# Patient Record
Sex: FEMALE | Race: WHITE | Employment: UNEMPLOYED | URBAN - METROPOLITAN AREA
[De-identification: names, ages, dates, MRNs, and addresses within clinical notes are randomized per-mention and may not be internally consistent; named-entity substitution may affect disease eponyms.]

---

## 2023-10-08 ENCOUNTER — HOSPITAL ENCOUNTER (OUTPATIENT)
Age: 78
Discharge: HOME | End: 2023-10-08
Payer: MEDICARE

## 2023-10-08 VITALS
SYSTOLIC BLOOD PRESSURE: 123 MMHG | OXYGEN SATURATION: 100 % | DIASTOLIC BLOOD PRESSURE: 70 MMHG | RESPIRATION RATE: 16 BRPM | HEART RATE: 70 BPM | TEMPERATURE: 97 F

## 2023-10-08 DIAGNOSIS — T30.0 BURN: ICD-10-CM

## 2023-10-08 DIAGNOSIS — L03.90 CELLULITIS, UNSPECIFIED CELLULITIS SITE: Primary | ICD-10-CM

## 2023-10-08 RX ORDER — CEPHALEXIN 500 MG/1
500 CAPSULE ORAL 3 TIMES DAILY
Qty: 30 CAPSULE | Refills: 0 | Status: SHIPPED | OUTPATIENT
Start: 2023-10-08 | End: 2023-10-18

## 2023-10-08 NOTE — ED INITIAL ASSESSMENT (HPI)
Patient is here with  a red rash that she states she had for a week but daughter states for months. She states she went to a dermatologist on Tuesday and was given medications. She states her skin is worse.

## 2024-01-01 ENCOUNTER — APPOINTMENT (OUTPATIENT)
Dept: CT IMAGING | Age: 79
DRG: 951 | End: 2024-01-01
Attending: EMERGENCY MEDICINE

## 2024-01-01 PROCEDURE — 72125 CT NECK SPINE W/O DYE: CPT

## 2024-01-01 PROCEDURE — 70450 CT HEAD/BRAIN W/O DYE: CPT

## 2024-01-01 PROCEDURE — 70486 CT MAXILLOFACIAL W/O DYE: CPT

## 2024-04-06 ENCOUNTER — HOSPITAL ENCOUNTER (OUTPATIENT)
Age: 79
Discharge: HOME | End: 2024-04-06
Payer: MEDICARE

## 2024-04-06 ENCOUNTER — APPOINTMENT (OUTPATIENT)
Dept: GENERAL RADIOLOGY | Age: 79
End: 2024-04-06
Attending: NURSE PRACTITIONER
Payer: MEDICARE

## 2024-04-06 VITALS
TEMPERATURE: 97 F | DIASTOLIC BLOOD PRESSURE: 81 MMHG | RESPIRATION RATE: 14 BRPM | HEART RATE: 75 BPM | OXYGEN SATURATION: 99 % | SYSTOLIC BLOOD PRESSURE: 121 MMHG

## 2024-04-06 DIAGNOSIS — M79.672 ACUTE FOOT PAIN, LEFT: ICD-10-CM

## 2024-04-06 DIAGNOSIS — L03.032 CELLULITIS OF TOE OF LEFT FOOT: Primary | ICD-10-CM

## 2024-04-06 PROCEDURE — 73630 X-RAY EXAM OF FOOT: CPT | Performed by: NURSE PRACTITIONER

## 2024-04-06 PROCEDURE — 99213 OFFICE O/P EST LOW 20 MIN: CPT | Performed by: NURSE PRACTITIONER

## 2024-04-06 RX ORDER — CEPHALEXIN 500 MG/1
500 CAPSULE ORAL 4 TIMES DAILY
Qty: 40 CAPSULE | Refills: 0 | Status: SHIPPED | OUTPATIENT
Start: 2024-04-06 | End: 2024-04-16

## 2024-04-06 NOTE — ED PROVIDER NOTES
Patient Seen in: [facility]      History     Chief Complaint   Patient presents with    Nail, Ingrown     Stated Complaint: Toe Problem Pain    Subjective:   HPI    79-year-old female presents with erythema and swelling of left second toe distal extending up to dorsum of foot for unknown time.  States swelling has decreased slightly but now is streaking up foot.  Noticed wound on side of second toe, with blood.  Does not recall injury.  Did not fall.    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  Constitutional and vital signs reviewed.      All other systems reviewed and negative except as noted above.    Physical Exam     ED Triage Vitals [04/06/24 1133]   /81   Pulse 75   Resp 14   Temp 97.1 °F (36.2 °C)   Temp src Temporal   SpO2 99 %   O2 Device None (Room air)       Current:/81   Pulse 75   Temp 97.1 °F (36.2 °C) (Temporal)   Resp 14   SpO2 99%         Physical Exam  Vitals and nursing note reviewed.   Constitutional:       General: She is not in acute distress.     Appearance: She is not toxic-appearing.   HENT:      Head: Normocephalic.      Nose: Nose normal. No congestion or rhinorrhea.      Mouth/Throat:      Mouth: Mucous membranes are moist.   Cardiovascular:      Pulses:           Dorsalis pedis pulses are 1+ on the left side.        Posterior tibial pulses are 1+  on the left side.   Pulmonary:      Effort: Pulmonary effort is normal. No respiratory distress.   Abdominal:      General: Abdomen is flat.   Musculoskeletal:         General: Normal range of motion.      Cervical back: Normal range of motion.        Feet:    Feet:      Right foot:      Toenail Condition: Right toenails are abnormally thick.      Left foot:      Skin integrity: Erythema present.      Toenail Condition: Left toenails are abnormally thick.      Comments: In between 2nd and 3rd distal toes is an skin erosion w/skin breakdown  Skin:     General: Skin is warm and dry.      Capillary Refill: Capillary refill takes less than 2 seconds.   Neurological:      General: No focal deficit present.      Mental Status: She is alert.               ED Course   Labs Reviewed - No data to display       ED Course as of 04/06/24 1251  ------------------------------------------------------------  Time: 04/06 1216  Value: XR FOOT, COMPLETE (MIN 3 VIEWS), LEFT (CPT=73630)  Comment:  Soft tissue inflammation the 2nd toe which could represent changes of cellulitis, vascular insufficiency, unrecognized trauma or other inflammatory process.  No acute osseous abnormality of the left foot.              MDM                                         Medical Decision Making  80y/o female p/w contusion v fracture v cellulitis of 2nd toe of L foot. Unknown mechanism. Pending xray. Major consideration is cellulitis given erythema of dorsal toe to foot. No active drainage from ulceration in between 2nd and 3rd toe.     Xray of foot left>I have directly viewed this exam, Negative per my read for acute fracture/dislocation. Pending rad read.     Xray> Soft tissue inflammation the 2nd toe which could represent changes of cellulitis, vascular insufficiency, unrecognized trauma or other inflammatory process.  No acute osseous abnormality of the left foot.    Will treat for skin infection, and sent rx for antibiotics, discussed podiatry follow  up given appropriate follow up here.     Physical exam remained stable over serial reexaminations as previously documented.      I have discussed with the patient the results of tests, differential diagnosis, and warning signs and symptoms that should prompt immediate return.  The patient understands these instructions and agrees to the follow-up plan provided.  There are no barriers to learning.   Appropriate f/u given.  Patient agrees to return for any concerns/problems/complications.            Disposition and Plan     Clinical Impression:  1. Cellulitis of toe of left foot    2. Acute foot pain, left         Disposition:  Discharge  4/6/2024 12:33 pm    Follow-up:  Tony Dee MD  [address] [address]  [phone]          Yeison Echeverria DPM  [address]  [place] [address] [phone]      As needed          Medications Prescribed:  Discharge Medication List as of 4/6/2024 12:33 PM        START taking these medications    Details   cephalexin 500 MG Oral Cap Take 1 capsule (500 mg total) by mouth 4 (four) times daily for 10 days., Normal, Disp-40 capsule, R-0

## 2024-04-06 NOTE — DISCHARGE INSTRUCTIONS
Watch for the redness to Left 2nd toe, markie if goes beyond demarcated line. I sent antibiotics to your pharmacy. Please follow up with your primary care doctor for further care and podiatry if needed.     -Please call your doctor or return if you notice signs of infection including:  a)Redness  b)Swelling  c)Foul odor  d)Discharge  e)Fever  f)Increased Pain  g)If the wound becomes warm to the touch

## 2024-06-06 ENCOUNTER — HOSPITAL ENCOUNTER (EMERGENCY)
Age: 79
Discharge: HOME | End: 2024-06-06
Attending: STUDENT IN AN ORGANIZED HEALTH CARE EDUCATION/TRAINING PROGRAM

## 2024-06-06 VITALS
SYSTOLIC BLOOD PRESSURE: 154 MMHG | BODY MASS INDEX: 25.71 KG/M2 | DIASTOLIC BLOOD PRESSURE: 102 MMHG | HEIGHT: 65 IN | WEIGHT: 154.32 LBS | RESPIRATION RATE: 16 BRPM | OXYGEN SATURATION: 97 % | TEMPERATURE: 97.8 F | HEART RATE: 70 BPM

## 2024-06-06 DIAGNOSIS — R42 POSTURAL DIZZINESS: Primary | ICD-10-CM

## 2024-06-06 LAB
ALBUMIN SERPL-MCNC: 3.8 G/DL (ref 3.6–5.1)
ALBUMIN/GLOB SERPL: 1.3 {RATIO} (ref 1–2.4)
ALP SERPL-CCNC: 106 UNITS/L (ref 45–117)
ALT SERPL-CCNC: 35 UNITS/L
ANION GAP SERPL CALC-SCNC: 8 MMOL/L (ref 7–19)
AST SERPL-CCNC: 29 UNITS/L
ATRIAL RATE (BPM): 78
BASOPHILS # BLD: 0 K/MCL (ref 0–0.3)
BASOPHILS NFR BLD: 1 %
BILIRUB SERPL-MCNC: 1.1 MG/DL (ref 0.2–1)
BUN SERPL-MCNC: 19 MG/DL (ref 6–20)
BUN/CREAT SERPL: 20 (ref 7–25)
CALCIUM SERPL-MCNC: 9.9 MG/DL (ref 8.4–10.2)
CHLORIDE SERPL-SCNC: 109 MMOL/L (ref 97–110)
CO2 SERPL-SCNC: 25 MMOL/L (ref 21–32)
CREAT SERPL-MCNC: 0.93 MG/DL (ref 0.51–0.95)
DEPRECATED RDW RBC: 46.6 FL (ref 39–50)
EGFRCR SERPLBLD CKD-EPI 2021: 62 ML/MIN/{1.73_M2}
EOSINOPHIL # BLD: 0.2 K/MCL (ref 0–0.5)
EOSINOPHIL NFR BLD: 3 %
ERYTHROCYTE [DISTWIDTH] IN BLOOD: 14.5 % (ref 11–15)
FASTING DURATION TIME PATIENT: ABNORMAL H
GLOBULIN SER-MCNC: 3 G/DL (ref 2–4)
GLUCOSE SERPL-MCNC: 131 MG/DL (ref 70–99)
HCT VFR BLD CALC: 39 % (ref 36–46.5)
HGB BLD-MCNC: 13 G/DL (ref 12–15.5)
IMM GRANULOCYTES # BLD AUTO: 0 K/MCL (ref 0–0.2)
IMM GRANULOCYTES # BLD: 0 %
LYMPHOCYTES # BLD: 1.1 K/MCL (ref 1–4)
LYMPHOCYTES NFR BLD: 20 %
MCH RBC QN AUTO: 29.2 PG (ref 26–34)
MCHC RBC AUTO-ENTMCNC: 33.3 G/DL (ref 32–36.5)
MCV RBC AUTO: 87.6 FL (ref 78–100)
MONOCYTES # BLD: 0.5 K/MCL (ref 0.3–0.9)
MONOCYTES NFR BLD: 10 %
NEUTROPHILS # BLD: 3.6 K/MCL (ref 1.8–7.7)
NEUTROPHILS NFR BLD: 66 %
NRBC BLD MANUAL-RTO: 0 /100 WBC
PLATELET # BLD AUTO: 151 K/MCL (ref 140–450)
POTASSIUM SERPL-SCNC: 3.9 MMOL/L (ref 3.4–5.1)
PROT SERPL-MCNC: 6.8 G/DL (ref 6.4–8.2)
QRS-INTERVAL (MSEC): 150
QT-INTERVAL (MSEC): 476
QTC: 514
R AXIS (DEGREES): -120
RAINBOW EXTRA TUBES HOLD SPECIMEN: NORMAL
RAINBOW EXTRA TUBES HOLD SPECIMEN: NORMAL
RBC # BLD: 4.45 MIL/MCL (ref 4–5.2)
REPORT TEXT: NORMAL
SODIUM SERPL-SCNC: 138 MMOL/L (ref 135–145)
T AXIS (DEGREES): 61
TROPONIN I SERPL DL<=0.01 NG/ML-MCNC: 7 NG/L
VENTRICULAR RATE EKG/MIN (BPM): 70
WBC # BLD: 5.4 K/MCL (ref 4.2–11)

## 2024-06-06 PROCEDURE — 85025 COMPLETE CBC W/AUTO DIFF WBC: CPT | Performed by: STUDENT IN AN ORGANIZED HEALTH CARE EDUCATION/TRAINING PROGRAM

## 2024-06-06 PROCEDURE — 80053 COMPREHEN METABOLIC PANEL: CPT | Performed by: STUDENT IN AN ORGANIZED HEALTH CARE EDUCATION/TRAINING PROGRAM

## 2024-06-06 PROCEDURE — 93010 ELECTROCARDIOGRAM REPORT: CPT | Performed by: INTERNAL MEDICINE

## 2024-06-06 PROCEDURE — 96360 HYDRATION IV INFUSION INIT: CPT

## 2024-06-06 PROCEDURE — 84484 ASSAY OF TROPONIN QUANT: CPT | Performed by: STUDENT IN AN ORGANIZED HEALTH CARE EDUCATION/TRAINING PROGRAM

## 2024-06-06 PROCEDURE — 93005 ELECTROCARDIOGRAM TRACING: CPT | Performed by: STUDENT IN AN ORGANIZED HEALTH CARE EDUCATION/TRAINING PROGRAM

## 2024-06-06 PROCEDURE — 99284 EMERGENCY DEPT VISIT MOD MDM: CPT

## 2024-06-06 PROCEDURE — 10002807 HB RX 258: Performed by: STUDENT IN AN ORGANIZED HEALTH CARE EDUCATION/TRAINING PROGRAM

## 2024-06-06 RX ADMIN — SODIUM CHLORIDE 1000 ML: 9 INJECTION, SOLUTION INTRAVENOUS at 08:53

## 2024-06-06 ASSESSMENT — PAIN SCALES - GENERAL
PAINLEVEL_OUTOF10: 0
PAINLEVEL_OUTOF10: 0

## 2024-06-14 ENCOUNTER — HOSPITAL ENCOUNTER (OUTPATIENT)
Age: 79
Discharge: ANOTHER HOSPITAL | End: 2024-06-14
Payer: MEDICARE

## 2024-06-14 VITALS
HEART RATE: 70 BPM | OXYGEN SATURATION: 100 % | SYSTOLIC BLOOD PRESSURE: 134 MMHG | TEMPERATURE: 97 F | HEIGHT: 65 IN | RESPIRATION RATE: 22 BRPM | DIASTOLIC BLOOD PRESSURE: 68 MMHG | WEIGHT: 140 LBS | BODY MASS INDEX: 23.32 KG/M2

## 2024-06-14 DIAGNOSIS — R42 DIZZINESS: Primary | ICD-10-CM

## 2024-06-14 LAB
ATRIAL RATE: 68 BPM
GLUCOSE BLDC GLUCOMTR-MCNC: 113 MG/DL (ref 70–99)
Q-T INTERVAL: 522 MS
QRS DURATION: 178 MS
QTC CALCULATION (BEZET): 563 MS
R AXIS: 170 DEGREES
T AXIS: 28 DEGREES
VENTRICULAR RATE: 70 BPM

## 2024-06-14 PROCEDURE — 93000 ELECTROCARDIOGRAM COMPLETE: CPT | Performed by: NURSE PRACTITIONER

## 2024-06-14 PROCEDURE — 82962 GLUCOSE BLOOD TEST: CPT | Performed by: NURSE PRACTITIONER

## 2024-06-14 PROCEDURE — 99215 OFFICE O/P EST HI 40 MIN: CPT | Performed by: NURSE PRACTITIONER

## 2024-06-14 NOTE — ED INITIAL ASSESSMENT (HPI)
Patient presents to IC with c/o dizziness over the last couple days.  Exacerbated with position changes.  Denies CP/SOB.

## 2024-06-14 NOTE — ED PROVIDER NOTES
Patient Seen in: [facility]      History     Chief Complaint   Patient presents with    Dizziness     Stated Complaint: Dizzness    Subjective:   HPI    80yo female p/w dizziness especially in the AM when moving from bed to standing. Started 2 days ago and usually goes away \"after walking, but it hasn't gone away today.\" Went to bed last night around 2200, woke up at doesn't recall time. Denies f/c/n/v/d. States she cannot walk long distances w/o sitting down. No dysuria/hematuria noted. No recent illness. No headaches noted.   No chest pain/sob. No stroke like symptoms.     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  Constitutional and vital signs reviewed.      All other systems reviewed and negative except as noted above.    Physical Exam     ED Triage Vitals [06/14/24 1110]   /73   Pulse 70   Resp 22   Temp 97.1 °F (36.2 °C)   Temp src Oral   SpO2 100 %   O2 Device None (Room air)       Current Vitals:   Vital Signs  BP: 134/68  Pulse: 70  Resp: 22  Temp: 97.1 °F (36.2 °C)  Temp src: Oral    Oxygen Therapy  SpO2: 100 %  O2 Device: None (Room air)            Physical Exam  Vitals and nursing note reviewed.   Constitutional:       General: She is not in acute distress.     Appearance: She is not toxic-appearing.    HENT:      Head: Normocephalic.      Nose: Nose normal. No congestion or rhinorrhea.      Mouth/Throat:      Mouth: Mucous membranes are moist.   Cardiovascular:      Rate and Rhythm: Normal rate and regular rhythm.   Pulmonary:      Effort: Pulmonary effort is normal. No respiratory distress.   Abdominal:      General: Abdomen is flat.   Musculoskeletal:         General: Normal range of motion.      Cervical back: Normal range of motion.   Skin:     General: Skin is warm and dry.      Capillary Refill: Capillary refill takes less than 2 seconds.   Neurological:      General: No focal deficit present.      Mental Status: She is alert.               ED Course   Labs Reviewed - No data to display  EKG    Rate, intervals and axes as noted on EKG Report.  Rate: 70  Rhythm: Ventricular-paced rhythm  Reading: Ventricular-paced rhythm, biventricular pacemaker detected              ED Course as of 06/14/24 1202  ------------------------------------------------------------  Time: 06/14 1128  Value: EKG 12 Lead  Comment:      Narrative  Performed by: [facility]    Ventricular-paced rhythm  Biventricular pacemaker detected  Abnormal ECG  No previous ECGs found in [facility]                    [facility]                                         Medical Decision Making  80yo female p/w dizziness x 2-3 days. Cf TIA v stroke. No recent trauma. No hematuria/dysuria. No other infectious findings.     -not likely migraine v tension headache or temporal artertis  -no recent illness making viral illness, or sinusitis unlikely, NOT worst HA of life NOT likely to be meningitis, NO meningeal symptoms  -maybe related to stroke as having global dizziness  Discuss w/Dr Carreon      No chest pain/iwob/sob. EKG w/paced rhythm.     Given the limitations of the immediate care and the likely need for advanced lab testing and/or imaging not available here the patient will be sent to the emergency department for further evaluation and management.  Offered and  declined EMS transfer. Here w/granddaughter whom will escort patient to ER at this time. Escorted via w/c to car.     Differential diagnosis reflecting the complexity of care include: MI, dehydration, UTI, CVA, TIA, dizziness, vertigo    Comorbidities that add complexity to management include:Pacemaker, heart history, medication history, age    External chart review was done and was noted:Yes    History obtained by an independent source was from: Patient    Discussions of management was done with:Patient    My independent interpretation of studies of:na    Diagnostic tests and medications considered but not ordered were:labs, CT scan    Social determinants of health that affect care:NA    Shared decision making was done by Self, Patient                Disposition and Plan     Clinical Impression:  1. Dizziness         Disposition:  Ic to ed  6/14/2024 11:49 am    Follow-up:  Tony Dee MD  [address] [address]  [phone]                Medications Prescribed:  Discharge Medication List as of 6/14/2024 11:51 AM

## 2024-06-17 ENCOUNTER — HOSPITAL ENCOUNTER (EMERGENCY)
Age: 79
Discharge: HOME | End: 2024-06-17
Attending: EMERGENCY MEDICINE

## 2024-06-17 ENCOUNTER — APPOINTMENT (OUTPATIENT)
Dept: CT IMAGING | Age: 79
End: 2024-06-17
Attending: EMERGENCY MEDICINE

## 2024-06-17 VITALS
WEIGHT: 143.08 LBS | HEIGHT: 65 IN | TEMPERATURE: 97.6 F | DIASTOLIC BLOOD PRESSURE: 73 MMHG | BODY MASS INDEX: 23.84 KG/M2 | HEART RATE: 75 BPM | SYSTOLIC BLOOD PRESSURE: 120 MMHG | OXYGEN SATURATION: 97 % | RESPIRATION RATE: 16 BRPM

## 2024-06-17 DIAGNOSIS — S09.90XA INJURY OF HEAD, INITIAL ENCOUNTER: Primary | ICD-10-CM

## 2024-06-17 PROCEDURE — 99285 EMERGENCY DEPT VISIT HI MDM: CPT

## 2024-06-17 PROCEDURE — 70450 CT HEAD/BRAIN W/O DYE: CPT

## 2024-06-17 PROCEDURE — 72125 CT NECK SPINE W/O DYE: CPT

## 2024-06-17 ASSESSMENT — PAIN SCALES - GENERAL: PAINLEVEL_OUTOF10: 1

## 2024-08-27 ENCOUNTER — APPOINTMENT (OUTPATIENT)
Dept: GENERAL RADIOLOGY | Age: 79
End: 2024-08-27
Attending: EMERGENCY MEDICINE

## 2024-08-27 ENCOUNTER — HOSPITAL ENCOUNTER (EMERGENCY)
Age: 79
Discharge: HOME | End: 2024-08-27
Attending: EMERGENCY MEDICINE

## 2024-08-27 VITALS
RESPIRATION RATE: 16 BRPM | TEMPERATURE: 97.9 F | SYSTOLIC BLOOD PRESSURE: 102 MMHG | HEART RATE: 74 BPM | DIASTOLIC BLOOD PRESSURE: 64 MMHG | OXYGEN SATURATION: 98 %

## 2024-08-27 DIAGNOSIS — R53.1 GENERALIZED WEAKNESS: Primary | ICD-10-CM

## 2024-08-27 LAB
ALBUMIN SERPL-MCNC: 3 G/DL (ref 3.6–5.1)
ALBUMIN/GLOB SERPL: 0.9 {RATIO} (ref 1–2.4)
ALP SERPL-CCNC: 107 UNITS/L (ref 45–117)
ALT SERPL-CCNC: 27 UNITS/L
ANION GAP SERPL CALC-SCNC: 10 MMOL/L (ref 7–19)
APPEARANCE UR: ABNORMAL
AST SERPL-CCNC: 28 UNITS/L
BACTERIA #/AREA URNS HPF: ABNORMAL /HPF
BASOPHILS # BLD: 0.1 K/MCL (ref 0–0.3)
BASOPHILS NFR BLD: 2 %
BILIRUB SERPL-MCNC: 1.5 MG/DL (ref 0.2–1)
BILIRUB UR QL STRIP: NEGATIVE
BUN SERPL-MCNC: 12 MG/DL (ref 6–20)
BUN/CREAT SERPL: 12 (ref 7–25)
CALCIUM SERPL-MCNC: 9.8 MG/DL (ref 8.4–10.2)
CHLORIDE SERPL-SCNC: 101 MMOL/L (ref 97–110)
CO2 SERPL-SCNC: 26 MMOL/L (ref 21–32)
COLOR UR: YELLOW
CREAT SERPL-MCNC: 1.02 MG/DL (ref 0.51–0.95)
DEPRECATED RDW RBC: 53.4 FL (ref 39–50)
EGFRCR SERPLBLD CKD-EPI 2021: 55 ML/MIN/{1.73_M2}
EOSINOPHIL # BLD: 0.9 K/MCL (ref 0–0.5)
EOSINOPHIL NFR BLD: 16 %
ERYTHROCYTE [DISTWIDTH] IN BLOOD: 16.5 % (ref 11–15)
FASTING DURATION TIME PATIENT: ABNORMAL H
FLUAV RNA RESP QL NAA+PROBE: NOT DETECTED
FLUBV RNA RESP QL NAA+PROBE: NOT DETECTED
GLOBULIN SER-MCNC: 3.2 G/DL (ref 2–4)
GLUCOSE SERPL-MCNC: 93 MG/DL (ref 70–99)
GLUCOSE UR STRIP-MCNC: NEGATIVE MG/DL
HCT VFR BLD CALC: 33.8 % (ref 36–46.5)
HGB BLD-MCNC: 11.2 G/DL (ref 12–15.5)
HGB UR QL STRIP: NEGATIVE
HYALINE CASTS #/AREA URNS LPF: ABNORMAL /LPF
KETONES UR STRIP-MCNC: ABNORMAL MG/DL
LEUKOCYTE ESTERASE UR QL STRIP: ABNORMAL
LYMPHOCYTES # BLD: 0.3 K/MCL (ref 1–4)
LYMPHOCYTES NFR BLD: 5 %
MAGNESIUM SERPL-MCNC: 1.8 MG/DL (ref 1.7–2.4)
MCH RBC QN AUTO: 29.5 PG (ref 26–34)
MCHC RBC AUTO-ENTMCNC: 33.1 G/DL (ref 32–36.5)
MCV RBC AUTO: 88.9 FL (ref 78–100)
MONOCYTES # BLD: 0.3 K/MCL (ref 0.3–0.9)
MONOCYTES NFR BLD: 5 %
NEUTROPHILS # BLD: 4.2 K/MCL (ref 1.8–7.7)
NEUTS SEG NFR BLD: 72 %
NITRITE UR QL STRIP: NEGATIVE
NRBC BLD MANUAL-RTO: 0 /100 WBC
PH UR STRIP: 6 [PH] (ref 5–7)
PLAT MORPH BLD: NORMAL
PLATELET # BLD AUTO: 84 K/MCL (ref 140–450)
POTASSIUM SERPL-SCNC: 3.3 MMOL/L (ref 3.4–5.1)
PROT SERPL-MCNC: 6.2 G/DL (ref 6.4–8.2)
PROT UR STRIP-MCNC: ABNORMAL MG/DL
QRS-INTERVAL (MSEC): 98
QRS-INTERVAL (MSEC): 98
QT-INTERVAL (MSEC): 380
QT-INTERVAL (MSEC): 394
QTC: 422
QTC: 477
R AXIS (DEGREES): 21
R AXIS (DEGREES): 46
RAINBOW EXTRA TUBES HOLD SPECIMEN: NORMAL
RAINBOW EXTRA TUBES HOLD SPECIMEN: NORMAL
RBC # BLD: 3.8 MIL/MCL (ref 4–5.2)
RBC #/AREA URNS HPF: ABNORMAL /HPF
RBC MORPH BLD: NORMAL
REPORT TEXT: NORMAL
REPORT TEXT: NORMAL
RSV AG NPH QL IA.RAPID: NOT DETECTED
SARS-COV-2 RNA RESP QL NAA+PROBE: NOT DETECTED
SERVICE CMNT-IMP: NORMAL
SERVICE CMNT-IMP: NORMAL
SODIUM SERPL-SCNC: 134 MMOL/L (ref 135–145)
SP GR UR STRIP: 1.02 (ref 1–1.03)
SQUAMOUS #/AREA URNS HPF: ABNORMAL /HPF
T AXIS (DEGREES): -28
T AXIS (DEGREES): -39
TROPONIN I SERPL DL<=0.01 NG/ML-MCNC: 6 NG/L
TROPONIN I SERPL DL<=0.01 NG/ML-MCNC: 8 NG/L
UROBILINOGEN UR STRIP-MCNC: 2 MG/DL
VENTRICULAR RATE EKG/MIN (BPM): 74
VENTRICULAR RATE EKG/MIN (BPM): 88
WBC # BLD: 5.9 K/MCL (ref 4.2–11)
WBC #/AREA URNS HPF: ABNORMAL /HPF
WBC TOXIC VACUOLES BLD QL SMEAR: PRESENT

## 2024-08-27 PROCEDURE — 85027 COMPLETE CBC AUTOMATED: CPT | Performed by: EMERGENCY MEDICINE

## 2024-08-27 PROCEDURE — 84484 ASSAY OF TROPONIN QUANT: CPT | Performed by: EMERGENCY MEDICINE

## 2024-08-27 PROCEDURE — 71046 X-RAY EXAM CHEST 2 VIEWS: CPT

## 2024-08-27 PROCEDURE — 93010 ELECTROCARDIOGRAM REPORT: CPT | Performed by: INTERNAL MEDICINE

## 2024-08-27 PROCEDURE — 80053 COMPREHEN METABOLIC PANEL: CPT | Performed by: EMERGENCY MEDICINE

## 2024-08-27 PROCEDURE — 0241U COVID/FLU/RSV PANEL: CPT | Performed by: EMERGENCY MEDICINE

## 2024-08-27 PROCEDURE — 10002803 HB RX 637: Performed by: EMERGENCY MEDICINE

## 2024-08-27 PROCEDURE — 83735 ASSAY OF MAGNESIUM: CPT | Performed by: EMERGENCY MEDICINE

## 2024-08-27 PROCEDURE — 99285 EMERGENCY DEPT VISIT HI MDM: CPT

## 2024-08-27 PROCEDURE — 81001 URINALYSIS AUTO W/SCOPE: CPT | Performed by: EMERGENCY MEDICINE

## 2024-08-27 PROCEDURE — 87186 SC STD MICRODIL/AGAR DIL: CPT | Performed by: EMERGENCY MEDICINE

## 2024-08-27 PROCEDURE — 36415 COLL VENOUS BLD VENIPUNCTURE: CPT

## 2024-08-27 PROCEDURE — 93005 ELECTROCARDIOGRAM TRACING: CPT | Performed by: EMERGENCY MEDICINE

## 2024-08-27 RX ADMIN — AMOXICILLIN AND CLAVULANATE POTASSIUM 1 TABLET: 875; 125 TABLET, FILM COATED ORAL at 12:37

## 2024-08-27 SDOH — SOCIAL STABILITY: SOCIAL INSECURITY: HOW OFTEN DOES ANYONE, INCLUDING FAMILY AND FRIENDS, THREATEN YOU WITH HARM?: NEVER

## 2024-08-27 SDOH — SOCIAL STABILITY: SOCIAL INSECURITY: HOW OFTEN DOES ANYONE, INCLUDING FAMILY AND FRIENDS, PHYSICALLY HURT YOU?: NEVER

## 2024-08-27 SDOH — SOCIAL STABILITY: SOCIAL INSECURITY: HOW OFTEN DOES ANYONE, INCLUDING FAMILY AND FRIENDS, INSULT OR TALK DOWN TO YOU?: NEVER

## 2024-08-27 SDOH — SOCIAL STABILITY: SOCIAL INSECURITY: HOW OFTEN DOES ANYONE, INCLUDING FAMILY AND FRIENDS, SCREAM OR CURSE AT YOU?: NEVER

## 2024-08-27 ASSESSMENT — PAIN SCALES - GENERAL: PAINLEVEL_OUTOF10: 0

## 2024-08-29 LAB — BACTERIA UR CULT: ABNORMAL

## 2024-09-05 ENCOUNTER — HOSPITAL ENCOUNTER (INPATIENT)
Age: 79
Discharge: STILL ADMITTED OR ADMINISTRATIVE | DRG: 951 | End: 2024-09-05
Attending: EMERGENCY MEDICINE | Admitting: INTERNAL MEDICINE

## 2024-09-05 DIAGNOSIS — S09.90XA TRAUMATIC INJURY OF HEAD, INITIAL ENCOUNTER: Primary | ICD-10-CM

## 2024-09-05 DIAGNOSIS — D69.6 THROMBOCYTOPENIA (CMD): ICD-10-CM

## 2024-09-05 DIAGNOSIS — S06.5XAA SUBDURAL HEMATOMA  (CMD): ICD-10-CM

## 2024-09-05 PROBLEM — I61.9 BRAIN BLEED  (CMD): Status: ACTIVE | Noted: 2024-01-01

## 2024-09-05 LAB
ABO + RH BLD: NORMAL
ABO + RH BLD: NORMAL
ABO REVERSE IS: NORMAL
ALBUMIN SERPL-MCNC: 3.3 G/DL (ref 3.6–5.1)
ALBUMIN/GLOB SERPL: 1 {RATIO} (ref 1–2.4)
ALP SERPL-CCNC: 94 UNITS/L (ref 45–117)
ALT SERPL-CCNC: 27 UNITS/L
ANION GAP SERPL CALC-SCNC: 12 MMOL/L (ref 7–19)
APTT PPP: 28 SEC (ref 22–32)
AST SERPL-CCNC: 32 UNITS/L
BILIRUB SERPL-MCNC: 3.1 MG/DL (ref 0.2–1)
BLD GP AB SCN SERPL QL GEL: NEGATIVE
BLOOD EXPIRATION DATE: NORMAL
BUN SERPL-MCNC: 25 MG/DL (ref 6–20)
BUN/CREAT SERPL: 20 (ref 7–25)
CALCIUM SERPL-MCNC: 9.9 MG/DL (ref 8.4–10.2)
CHLORIDE SERPL-SCNC: 98 MMOL/L (ref 97–110)
CLOSURE TME BLD-IMP: ABNORMAL
CLOSURE TME COLL+ADP BLD: >249 SEC (ref 50–114)
CLOSURE TME COLL+EPINEP BLD: >242 SEC (ref 70–160)
CO2 SERPL-SCNC: 25 MMOL/L (ref 21–32)
CREAT SERPL-MCNC: 1.27 MG/DL (ref 0.51–0.95)
DEPRECATED RDW RBC: 56.4 FL (ref 39–50)
DEPRECATED RDW RBC: 57 FL (ref 39–50)
DISPENSE STATUS: NORMAL
EGFRCR SERPLBLD CKD-EPI 2021: 42 ML/MIN/{1.73_M2}
EOSINOPHIL # BLD: 1 K/MCL (ref 0–0.5)
EOSINOPHIL # BLD: 1.3 K/MCL (ref 0–0.5)
EOSINOPHIL NFR BLD: 12 %
EOSINOPHIL NFR BLD: 17 %
ERYTHROCYTE [DISTWIDTH] IN BLOOD: 17.7 % (ref 11–15)
ERYTHROCYTE [DISTWIDTH] IN BLOOD: 17.9 % (ref 11–15)
FASTING DURATION TIME PATIENT: ABNORMAL H
GLOBULIN SER-MCNC: 3.2 G/DL (ref 2–4)
GLUCOSE SERPL-MCNC: 102 MG/DL (ref 70–99)
HCT VFR BLD CALC: 24.9 % (ref 36–46.5)
HCT VFR BLD CALC: 30.5 % (ref 36–46.5)
HGB BLD-MCNC: 8.3 G/DL (ref 12–15.5)
HGB BLD-MCNC: 9.9 G/DL (ref 12–15.5)
INR PPP: 1.5
ISBT BLOOD TYPE: 5100
ISBT BLOOD TYPE: 5100
ISBT BLOOD TYPE: 6200
ISBT BLOOD TYPE: 6200
ISSUE DATE/TIME: NORMAL
LYMPHOCYTES # BLD: 0.7 K/MCL (ref 1–4)
LYMPHOCYTES # BLD: 0.9 K/MCL (ref 1–4)
LYMPHOCYTES NFR BLD: 11 %
LYMPHOCYTES NFR BLD: 9 %
MAGNESIUM SERPL-MCNC: 1.7 MG/DL (ref 1.7–2.4)
MCH RBC QN AUTO: 29.6 PG (ref 26–34)
MCH RBC QN AUTO: 30.2 PG (ref 26–34)
MCHC RBC AUTO-ENTMCNC: 32.5 G/DL (ref 32–36.5)
MCHC RBC AUTO-ENTMCNC: 33.3 G/DL (ref 32–36.5)
MCV RBC AUTO: 90.5 FL (ref 78–100)
MCV RBC AUTO: 91 FL (ref 78–100)
MONOCYTES # BLD: 0.3 K/MCL (ref 0.3–0.9)
MONOCYTES # BLD: 0.4 K/MCL (ref 0.3–0.9)
MONOCYTES NFR BLD: 4 %
MONOCYTES NFR BLD: 5 %
NEUTROPHILS # BLD: 5 K/MCL (ref 1.8–7.7)
NEUTROPHILS # BLD: 6.1 K/MCL (ref 1.8–7.7)
NEUTS BAND NFR BLD: 1 % (ref 0–10)
NEUTS SEG NFR BLD: 66 %
NEUTS SEG NFR BLD: 74 %
NRBC BLD MANUAL-RTO: 0 /100 WBC
NRBC BLD MANUAL-RTO: 0 /100 WBC
PLATELET # BLD AUTO: <1 K/MCL (ref 140–450)
PLATELET # BLD AUTO: <1 K/MCL (ref 140–450)
POLYCHROMASIA BLD QL SMEAR: ABNORMAL
POLYCHROMASIA BLD QL SMEAR: ABNORMAL
POTASSIUM SERPL-SCNC: 3.2 MMOL/L (ref 3.4–5.1)
PRODUCT CODE: NORMAL
PRODUCT DESCRIPTION: NORMAL
PRODUCT ID: NORMAL
PROT SERPL-MCNC: 6.5 G/DL (ref 6.4–8.2)
PROTHROMBIN TIME: 15.4 SEC (ref 9.7–11.8)
RBC # BLD: 2.75 MIL/MCL (ref 4–5.2)
RBC # BLD: 3.35 MIL/MCL (ref 4–5.2)
SODIUM SERPL-SCNC: 132 MMOL/L (ref 135–145)
TROPONIN I SERPL DL<=0.01 NG/ML-MCNC: 7 NG/L
TYPE AND SCREEN EXPIRATION DATE: NORMAL
UNIT BLOOD TYPE: NORMAL
UNIT NUMBER: NORMAL
VARIANT LYMPHS NFR BLD: 1 % (ref 0–5)
WBC # BLD: 7.5 K/MCL (ref 4.2–11)
WBC # BLD: 8.1 K/MCL (ref 4.2–11)
WBC MORPH BLD: NORMAL
WBC MORPH BLD: NORMAL

## 2024-09-05 PROCEDURE — 83735 ASSAY OF MAGNESIUM: CPT | Performed by: EMERGENCY MEDICINE

## 2024-09-05 PROCEDURE — 85730 THROMBOPLASTIN TIME PARTIAL: CPT | Performed by: EMERGENCY MEDICINE

## 2024-09-05 PROCEDURE — 99291 CRITICAL CARE FIRST HOUR: CPT

## 2024-09-05 PROCEDURE — P9073 PLATELETS PHERESIS PATH REDU: HCPCS

## 2024-09-05 PROCEDURE — 10002800 HB RX 250 W HCPCS

## 2024-09-05 PROCEDURE — 85610 PROTHROMBIN TIME: CPT | Performed by: EMERGENCY MEDICINE

## 2024-09-05 PROCEDURE — 80053 COMPREHEN METABOLIC PANEL: CPT | Performed by: EMERGENCY MEDICINE

## 2024-09-05 PROCEDURE — 85027 COMPLETE CBC AUTOMATED: CPT | Performed by: EMERGENCY MEDICINE

## 2024-09-05 PROCEDURE — 93010 ELECTROCARDIOGRAM REPORT: CPT | Performed by: INTERNAL MEDICINE

## 2024-09-05 PROCEDURE — 86901 BLOOD TYPING SEROLOGIC RH(D): CPT | Performed by: EMERGENCY MEDICINE

## 2024-09-05 PROCEDURE — G0390 TRAUMA RESPONS W/HOSP CRITI: HCPCS

## 2024-09-05 PROCEDURE — 85576 BLOOD PLATELET AGGREGATION: CPT

## 2024-09-05 PROCEDURE — 36415 COLL VENOUS BLD VENIPUNCTURE: CPT

## 2024-09-05 PROCEDURE — 93005 ELECTROCARDIOGRAM TRACING: CPT | Performed by: EMERGENCY MEDICINE

## 2024-09-05 PROCEDURE — 84484 ASSAY OF TROPONIN QUANT: CPT | Performed by: EMERGENCY MEDICINE

## 2024-09-05 RX ORDER — LOSARTAN POTASSIUM 25 MG/1
25 TABLET ORAL DAILY
COMMUNITY

## 2024-09-05 RX ORDER — BISOPROLOL FUMARATE 5 MG/1
5 TABLET, FILM COATED ORAL DAILY
COMMUNITY

## 2024-09-05 RX ORDER — FLECAINIDE ACETATE 100 MG/1
50 TABLET ORAL 2 TIMES DAILY
COMMUNITY

## 2024-09-05 RX ORDER — LEVOTHYROXINE SODIUM 112 UG/1
112 TABLET ORAL DAILY
COMMUNITY

## 2024-09-05 RX ORDER — SODIUM CHLORIDE 9 MG/ML
INJECTION, SOLUTION INTRAVENOUS CONTINUOUS PRN
Status: DISCONTINUED | OUTPATIENT
Start: 2024-09-05 | End: 2024-09-08 | Stop reason: HOSPADM

## 2024-09-05 RX ORDER — LEVETIRACETAM 500 MG/5ML
500 INJECTION, SOLUTION, CONCENTRATE INTRAVENOUS EVERY 12 HOURS SCHEDULED
Status: DISCONTINUED | OUTPATIENT
Start: 2024-09-06 | End: 2024-09-08 | Stop reason: HOSPADM

## 2024-09-05 RX ORDER — ATORVASTATIN CALCIUM 10 MG/1
10 TABLET, FILM COATED ORAL DAILY
COMMUNITY

## 2024-09-05 RX ORDER — TORSEMIDE 10 MG/1
10 TABLET ORAL DAILY
COMMUNITY

## 2024-09-05 RX ORDER — ROPINIROLE 0.25 MG/1
0.5 TABLET, FILM COATED ORAL AT BEDTIME
COMMUNITY

## 2024-09-05 RX ORDER — MECLIZINE HYDROCHLORIDE 25 MG/1
25 TABLET ORAL 3 TIMES DAILY PRN
COMMUNITY

## 2024-09-05 RX ORDER — SACUBITRIL AND VALSARTAN 24; 26 MG/1; MG/1
1 TABLET, FILM COATED ORAL DAILY
COMMUNITY

## 2024-09-05 RX ORDER — HYDRALAZINE HYDROCHLORIDE 20 MG/ML
10 INJECTION INTRAMUSCULAR; INTRAVENOUS EVERY 6 HOURS PRN
Status: DISCONTINUED | OUTPATIENT
Start: 2024-09-05 | End: 2024-09-08 | Stop reason: HOSPADM

## 2024-09-05 RX ORDER — ESCITALOPRAM OXALATE 5 MG/1
5 TABLET ORAL DAILY
COMMUNITY

## 2024-09-05 RX ORDER — LEVETIRACETAM 500 MG/5ML
1000 INJECTION, SOLUTION, CONCENTRATE INTRAVENOUS ONCE
Status: COMPLETED | OUTPATIENT
Start: 2024-09-05 | End: 2024-09-05

## 2024-09-05 RX ADMIN — LEVETIRACETAM 1000 MG: 100 INJECTION, SOLUTION INTRAVENOUS at 21:42

## 2024-09-05 RX ADMIN — MORPHINE SULFATE 2 MG: 2 INJECTION, SOLUTION INTRAMUSCULAR; INTRAVENOUS at 23:26

## 2024-09-05 ASSESSMENT — PAIN SCALES - GENERAL: PAINLEVEL_OUTOF10: 4

## 2024-09-06 PROBLEM — R31.9 HEMATURIA: Status: ACTIVE | Noted: 2024-09-06

## 2024-09-06 LAB
BLOOD EXPIRATION DATE: NORMAL
BLOOD EXPIRATION DATE: NORMAL
CROSSMATCH RESULT: NORMAL
CROSSMATCH RESULT: NORMAL
DISPENSE STATUS: NORMAL
DISPENSE STATUS: NORMAL
ISBT BLOOD TYPE: 6200
ISBT BLOOD TYPE: 6200
PRODUCT CODE: NORMAL
PRODUCT CODE: NORMAL
PRODUCT DESCRIPTION: NORMAL
PRODUCT DESCRIPTION: NORMAL
PRODUCT ID: NORMAL
PRODUCT ID: NORMAL
QRS-INTERVAL (MSEC): 184
QT-INTERVAL (MSEC): 426
QTC: 568
R AXIS (DEGREES): -20
RAINBOW EXTRA TUBES HOLD SPECIMEN: NORMAL
REPORT TEXT: NORMAL
T AXIS (DEGREES): 146
UNIT BLOOD TYPE: NORMAL
UNIT BLOOD TYPE: NORMAL
UNIT NUMBER: NORMAL
UNIT NUMBER: NORMAL
VENTRICULAR RATE EKG/MIN (BPM): 107

## 2024-09-06 PROCEDURE — 10002800 HB RX 250 W HCPCS

## 2024-09-06 PROCEDURE — 10004651 HB RX, NO CHARGE ITEM: Performed by: INTERNAL MEDICINE

## 2024-09-06 PROCEDURE — 10002800 HB RX 250 W HCPCS: Performed by: INTERNAL MEDICINE

## 2024-09-06 PROCEDURE — 10002803 HB RX 637: Performed by: INTERNAL MEDICINE

## 2024-09-06 PROCEDURE — 10000002 HB ROOM CHARGE MED SURG

## 2024-09-06 PROCEDURE — 86920 COMPATIBILITY TEST SPIN: CPT

## 2024-09-06 RX ORDER — MAGNESIUM HYDROXIDE/ALUMINUM HYDROXICE/SIMETHICONE 120; 1200; 1200 MG/30ML; MG/30ML; MG/30ML
30 SUSPENSION ORAL PRN
Status: DISCONTINUED | OUTPATIENT
Start: 2024-09-06 | End: 2024-09-08 | Stop reason: HOSPADM

## 2024-09-06 RX ORDER — AMOXICILLIN 250 MG
1 CAPSULE ORAL 2 TIMES DAILY PRN
Status: DISCONTINUED | OUTPATIENT
Start: 2024-09-06 | End: 2024-09-08 | Stop reason: HOSPADM

## 2024-09-06 RX ORDER — SCOLOPAMINE TRANSDERMAL SYSTEM 1 MG/1
1 PATCH, EXTENDED RELEASE TRANSDERMAL
Status: DISCONTINUED | OUTPATIENT
Start: 2024-09-06 | End: 2024-09-08 | Stop reason: HOSPADM

## 2024-09-06 RX ORDER — MORPHINE SULFATE 20 MG/ML
5 SOLUTION ORAL
Status: DISCONTINUED | OUTPATIENT
Start: 2024-09-06 | End: 2024-09-07 | Stop reason: SDUPTHER

## 2024-09-06 RX ORDER — LIDOCAINE HYDROCHLORIDE 20 MG/ML
10 JELLY TOPICAL PRN
Status: DISCONTINUED | OUTPATIENT
Start: 2024-09-06 | End: 2024-09-08 | Stop reason: HOSPADM

## 2024-09-06 RX ORDER — BISACODYL 10 MG
10 SUPPOSITORY, RECTAL RECTAL DAILY PRN
Status: DISCONTINUED | OUTPATIENT
Start: 2024-09-06 | End: 2024-09-08

## 2024-09-06 RX ORDER — GLYCOPYRROLATE 1 MG/5ML
0.5 SOLUTION ORAL EVERY 6 HOURS PRN
Status: DISCONTINUED | OUTPATIENT
Start: 2024-09-06 | End: 2024-09-08 | Stop reason: HOSPADM

## 2024-09-06 RX ORDER — GLYCOPYRROLATE 1 MG/1
0.5 TABLET ORAL EVERY 6 HOURS PRN
Status: DISCONTINUED | OUTPATIENT
Start: 2024-09-06 | End: 2024-09-08 | Stop reason: HOSPADM

## 2024-09-06 RX ORDER — LORAZEPAM 2 MG/ML
1 INJECTION INTRAMUSCULAR EVERY 5 MIN PRN
Status: DISCONTINUED | OUTPATIENT
Start: 2024-09-06 | End: 2024-09-08 | Stop reason: HOSPADM

## 2024-09-06 RX ORDER — GLYCOPYRROLATE 0.2 MG/ML
0.4 INJECTION, SOLUTION INTRAMUSCULAR; INTRAVENOUS EVERY 6 HOURS PRN
Status: DISCONTINUED | OUTPATIENT
Start: 2024-09-06 | End: 2024-09-08 | Stop reason: HOSPADM

## 2024-09-06 RX ORDER — LORAZEPAM 2 MG/ML
0.5 INJECTION INTRAMUSCULAR
Status: DISCONTINUED | OUTPATIENT
Start: 2024-09-06 | End: 2024-09-08 | Stop reason: HOSPADM

## 2024-09-06 RX ORDER — CARBOXYMETHYLCELLULOSE SODIUM 5 MG/ML
1 SOLUTION/ DROPS OPHTHALMIC PRN
Status: DISCONTINUED | OUTPATIENT
Start: 2024-09-06 | End: 2024-09-08 | Stop reason: HOSPADM

## 2024-09-06 RX ORDER — LORAZEPAM 1 MG/1
0.5 TABLET ORAL
Status: DISCONTINUED | OUTPATIENT
Start: 2024-09-06 | End: 2024-09-08 | Stop reason: HOSPADM

## 2024-09-06 RX ORDER — 0.9 % SODIUM CHLORIDE 0.9 %
2 VIAL (ML) INJECTION EVERY 12 HOURS SCHEDULED
Status: DISCONTINUED | OUTPATIENT
Start: 2024-09-06 | End: 2024-09-08 | Stop reason: HOSPADM

## 2024-09-06 RX ADMIN — MORPHINE SULFATE 2 MG: 2 INJECTION, SOLUTION INTRAMUSCULAR; INTRAVENOUS at 05:43

## 2024-09-06 RX ADMIN — SCOPOLAMINE 1 PATCH: 1 SYSTEM TRANSDERMAL at 12:40

## 2024-09-06 RX ADMIN — MORPHINE SULFATE 2 MG: 2 INJECTION, SOLUTION INTRAMUSCULAR; INTRAVENOUS at 08:50

## 2024-09-06 RX ADMIN — MORPHINE SULFATE 2 MG: 2 INJECTION, SOLUTION INTRAMUSCULAR; INTRAVENOUS at 07:03

## 2024-09-06 RX ADMIN — MORPHINE SULFATE 2 MG: 2 INJECTION, SOLUTION INTRAMUSCULAR; INTRAVENOUS at 19:57

## 2024-09-06 RX ADMIN — MORPHINE SULFATE 2 MG: 2 INJECTION, SOLUTION INTRAMUSCULAR; INTRAVENOUS at 12:28

## 2024-09-06 RX ADMIN — MORPHINE SULFATE 2 MG: 2 INJECTION, SOLUTION INTRAMUSCULAR; INTRAVENOUS at 01:21

## 2024-09-06 RX ADMIN — SODIUM CHLORIDE, PRESERVATIVE FREE 2 ML: 5 INJECTION INTRAVENOUS at 20:03

## 2024-09-06 RX ADMIN — LORAZEPAM 1 MG: 2 INJECTION INTRAMUSCULAR; INTRAVENOUS at 18:51

## 2024-09-06 RX ADMIN — GLYCOPYRROLATE 0.4 MG: 0.2 INJECTION, SOLUTION INTRAMUSCULAR; INTRAVENOUS at 08:50

## 2024-09-06 RX ADMIN — MORPHINE SULFATE 2 MG: 2 INJECTION, SOLUTION INTRAMUSCULAR; INTRAVENOUS at 18:51

## 2024-09-06 RX ADMIN — SODIUM CHLORIDE, PRESERVATIVE FREE 2 ML: 5 INJECTION INTRAVENOUS at 08:50

## 2024-09-06 RX ADMIN — MORPHINE SULFATE 2 MG: 2 INJECTION, SOLUTION INTRAMUSCULAR; INTRAVENOUS at 03:09

## 2024-09-06 RX ADMIN — LEVETIRACETAM 500 MG: 100 INJECTION, SOLUTION INTRAVENOUS at 20:15

## 2024-09-06 RX ADMIN — LEVETIRACETAM 500 MG: 100 INJECTION, SOLUTION INTRAVENOUS at 08:50

## 2024-09-06 RX ADMIN — LORAZEPAM 1 MG: 2 INJECTION INTRAMUSCULAR; INTRAVENOUS at 12:28

## 2024-09-06 RX ADMIN — LORAZEPAM 1 MG: 2 INJECTION INTRAMUSCULAR; INTRAVENOUS at 07:03

## 2024-09-06 RX ADMIN — LORAZEPAM 1 MG: 2 INJECTION INTRAMUSCULAR; INTRAVENOUS at 19:58

## 2024-09-06 RX ADMIN — MORPHINE SULFATE 2 MG: 2 INJECTION, SOLUTION INTRAMUSCULAR; INTRAVENOUS at 04:36

## 2024-09-06 SDOH — ECONOMIC STABILITY: TRANSPORTATION INSECURITY
IN THE PAST 12 MONTHS, HAS LACK OF RELIABLE TRANSPORTATION KEPT YOU FROM MEDICAL APPOINTMENTS, MEETINGS, WORK OR FROM GETTING THINGS NEEDED FOR DAILY LIVING?: NO

## 2024-09-06 SDOH — ECONOMIC STABILITY: FOOD INSECURITY: WITHIN THE PAST 12 MONTHS, THE FOOD YOU BOUGHT JUST DIDN'T LAST AND YOU DIDN'T HAVE MONEY TO GET MORE.: NEVER TRUE

## 2024-09-06 SDOH — ECONOMIC STABILITY: GENERAL

## 2024-09-06 SDOH — ECONOMIC STABILITY: INCOME INSECURITY: IN THE PAST 12 MONTHS, HAS THE ELECTRIC, GAS, OIL, OR WATER COMPANY THREATENED TO SHUT OFF SERVICE IN YOUR HOME?: NO

## 2024-09-06 SDOH — ECONOMIC STABILITY: HOUSING INSECURITY: WHAT IS YOUR LIVING SITUATION TODAY?: OTHER (COMMENT)

## 2024-09-06 SDOH — SOCIAL STABILITY: SOCIAL INSECURITY: HOW OFTEN DOES ANYONE, INCLUDING FAMILY AND FRIENDS, THREATEN YOU WITH HARM?: NEVER

## 2024-09-06 SDOH — SOCIAL STABILITY: SOCIAL INSECURITY: HOW OFTEN DOES ANYONE, INCLUDING FAMILY AND FRIENDS, PHYSICALLY HURT YOU?: NEVER

## 2024-09-06 SDOH — HEALTH STABILITY: PHYSICAL HEALTH: DO YOU HAVE SERIOUS DIFFICULTY WALKING OR CLIMBING STAIRS?: NO

## 2024-09-06 SDOH — ECONOMIC STABILITY: HOUSING INSECURITY: WHAT IS YOUR LIVING SITUATION TODAY?: OTHER FACILITY RESIDENTS

## 2024-09-06 SDOH — SOCIAL STABILITY: SOCIAL INSECURITY: HOW OFTEN DOES ANYONE, INCLUDING FAMILY AND FRIENDS, INSULT OR TALK DOWN TO YOU?: NEVER

## 2024-09-06 SDOH — HEALTH STABILITY: PHYSICAL HEALTH: DO YOU HAVE DIFFICULTY DRESSING OR BATHING?: NO

## 2024-09-06 SDOH — SOCIAL STABILITY: SOCIAL INSECURITY: HOW OFTEN DOES ANYONE, INCLUDING FAMILY AND FRIENDS, SCREAM OR CURSE AT YOU?: NEVER

## 2024-09-06 SDOH — HEALTH STABILITY: GENERAL: BECAUSE OF A PHYSICAL, MENTAL, OR EMOTIONAL CONDITION, DO YOU HAVE DIFFICULTY DOING ERRANDS ALONE?: NO

## 2024-09-06 SDOH — ECONOMIC STABILITY: HOUSING INSECURITY: WHAT IS YOUR LIVING SITUATION TODAY?: I HAVE A STEADY PLACE TO LIVE

## 2024-09-06 SDOH — SOCIAL STABILITY: SOCIAL NETWORK: SUPPORT SYSTEMS: FAMILY MEMBERS;FRIENDS

## 2024-09-06 SDOH — ECONOMIC STABILITY: HOUSING INSECURITY: DO YOU HAVE PROBLEMS WITH ANY OF THE FOLLOWING?: NONE OF THE ABOVE

## 2024-09-06 SDOH — SOCIAL STABILITY: SOCIAL NETWORK
HOW OFTEN DO YOU SEE OR TALK TO PEOPLE THAT YOU CARE ABOUT AND FEEL CLOSE TO? (FOR EXAMPLE: TALKING TO FRIENDS ON THE PHONE, VISITING FRIENDS OR FAMILY, GOING TO CHURCH OR CLUB MEETINGS): 5 OR MORE TIMES A WEEK

## 2024-09-06 SDOH — ECONOMIC STABILITY: GENERAL: WOULD YOU LIKE HELP WITH ANY OF THE FOLLOWING NEEDS?: I DON'T WANT HELP WITH ANY OF THESE

## 2024-09-06 SDOH — HEALTH STABILITY: GENERAL
BECAUSE OF A PHYSICAL, MENTAL, OR EMOTIONAL CONDITION, DO YOU HAVE SERIOUS DIFFICULTY CONCENTRATING, REMEMBERING OR MAKING DECISIONS?: NO

## 2024-09-06 ASSESSMENT — ACTIVITIES OF DAILY LIVING (ADL)
DRESSING: INDEPENDENT
ADL_BEFORE_ADMISSION: INDEPENDENT
FEEDING: INDEPENDENT
DRESSING: INDEPENDENT
BATHING: INDEPENDENT
TOILETING: INDEPENDENT
BATHING: INDEPENDENT
ADL_BEFORE_ADMISSION: NEEDS/REQUIRES ASSISTANCE
RECENT_DECLINE_ADL: NO
ADL_SCORE: 24
ADL_SHORT_OF_BREATH: NO
FEEDING: NEEDS ASSISTANCE
ADL_SCORE: 11
TOILETING: INDEPENDENT

## 2024-09-06 ASSESSMENT — ENCOUNTER SYMPTOMS
BRUISES/BLEEDS EASILY: 1
SHORTNESS OF BREATH: 0
NAUSEA: 0
DIZZINESS: 0
ABDOMINAL PAIN: 0
FEVER: 0
DIARRHEA: 0
CHILLS: 0
LIGHT-HEADEDNESS: 0
VOMITING: 0

## 2024-09-06 ASSESSMENT — LIFESTYLE VARIABLES
HOW MANY STANDARD DRINKS CONTAINING ALCOHOL DO YOU HAVE ON A TYPICAL DAY: 0,1 OR 2
AUDIT-C TOTAL SCORE: 0
HOW OFTEN DO YOU HAVE A DRINK CONTAINING ALCOHOL: NEVER
HOW OFTEN DO YOU HAVE 6 OR MORE DRINKS ON ONE OCCASION: NEVER
ALCOHOL_USE_STATUS: NO OR LOW RISK WITH VALIDATED TOOL

## 2024-09-06 ASSESSMENT — PATIENT HEALTH QUESTIONNAIRE - PHQ9: IS PATIENT ABLE TO COMPLETE PHQ2 OR PHQ9: NO, PATIENT WILL NEVER BE ABLE TO COMPLETE

## 2024-09-06 ASSESSMENT — PAIN SCALES - BEHAVIORAL PAIN SCALE (BPS)
BPS_SCORE: 3
BPS_SCORE: 3

## 2024-09-07 VITALS
HEIGHT: 65 IN | WEIGHT: 136.47 LBS | OXYGEN SATURATION: 72 % | BODY MASS INDEX: 22.74 KG/M2 | RESPIRATION RATE: 24 BRPM | DIASTOLIC BLOOD PRESSURE: 39 MMHG | SYSTOLIC BLOOD PRESSURE: 44 MMHG | TEMPERATURE: 100.4 F | HEART RATE: 59 BPM

## 2024-09-07 PROCEDURE — 10002800 HB RX 250 W HCPCS

## 2024-09-07 PROCEDURE — 10002800 HB RX 250 W HCPCS: Performed by: INTERNAL MEDICINE

## 2024-09-07 PROCEDURE — 10004651 HB RX, NO CHARGE ITEM: Performed by: INTERNAL MEDICINE

## 2024-09-07 PROCEDURE — 10002800 HB RX 250 W HCPCS: Performed by: FAMILY MEDICINE

## 2024-09-07 PROCEDURE — 10000002 HB ROOM CHARGE MED SURG

## 2024-09-07 RX ORDER — MORPHINE SULFATE IN 0.9 % NACL 30 MG/30ML
0-10 PATIENT CONTROLLED ANALGESIA SYRINGE INTRAVENOUS CONTINUOUS
Status: DISCONTINUED | OUTPATIENT
Start: 2024-09-07 | End: 2024-09-08 | Stop reason: HOSPADM

## 2024-09-07 RX ADMIN — LORAZEPAM 1 MG: 2 INJECTION INTRAMUSCULAR; INTRAVENOUS at 07:38

## 2024-09-07 RX ADMIN — MORPHINE SULFATE 2 MG: 2 INJECTION, SOLUTION INTRAMUSCULAR; INTRAVENOUS at 07:37

## 2024-09-07 RX ADMIN — SODIUM CHLORIDE, PRESERVATIVE FREE 2 ML: 5 INJECTION INTRAVENOUS at 20:25

## 2024-09-07 RX ADMIN — MORPHINE SULFATE 2 MG: 2 INJECTION, SOLUTION INTRAMUSCULAR; INTRAVENOUS at 08:43

## 2024-09-07 RX ADMIN — Medication 2 MG/HR: at 14:14

## 2024-09-07 RX ADMIN — SODIUM CHLORIDE, PRESERVATIVE FREE 2 ML: 5 INJECTION INTRAVENOUS at 08:45

## 2024-09-07 RX ADMIN — LORAZEPAM 1 MG: 2 INJECTION INTRAMUSCULAR; INTRAVENOUS at 04:07

## 2024-09-07 RX ADMIN — MORPHINE SULFATE 2 MG: 2 INJECTION, SOLUTION INTRAMUSCULAR; INTRAVENOUS at 10:43

## 2024-09-07 RX ADMIN — MORPHINE SULFATE 2 MG: 2 INJECTION, SOLUTION INTRAMUSCULAR; INTRAVENOUS at 00:06

## 2024-09-07 RX ADMIN — LEVETIRACETAM 500 MG: 100 INJECTION, SOLUTION INTRAVENOUS at 08:44

## 2024-09-07 RX ADMIN — LEVETIRACETAM 500 MG: 100 INJECTION, SOLUTION INTRAVENOUS at 20:25

## 2024-09-07 RX ADMIN — LORAZEPAM 1 MG: 2 INJECTION INTRAMUSCULAR; INTRAVENOUS at 00:06

## 2024-09-07 RX ADMIN — MORPHINE SULFATE 2 MG: 2 INJECTION, SOLUTION INTRAMUSCULAR; INTRAVENOUS at 04:07

## 2024-09-07 ASSESSMENT — PAIN SCALES - PAIN ASSESSMENT IN ADVANCED DEMENTIA (PAINAD)
NEGVOCALIZATION: OCCASIONAL MOAN OR GROAN, LOW LEVELS OF SPEECH WITH A NEGATIVE OR DISAPPROVING QUALITY
FACIALEXPRESSION: SMILING OR INEXPRESSIVE
BODYLANGUAGE: RELAXED
BREATHING: OCCASIONAL LABORED BREATHING, SHORT PERIOD OF HYPERVENTILATION
NEGVOCALIZATION: OCCASIONAL MOAN OR GROAN, LOW LEVELS OF SPEECH WITH A NEGATIVE OR DISAPPROVING QUALITY
CONSOLABILITY: NO NEED TO CONSOLE
BODYLANGUAGE: RELAXED
TOTALSCORE: 3
CONSOLABILITY: NO NEED TO CONSOLE
BREATHING: NOISY LABORED BREATHING, LONG PERIODS HYPERVENTILATION, CHEYNE-STOKES RESPIRATIONS
TOTALSCORE: 2
FACIALEXPRESSION: SMILING OR INEXPRESSIVE

## 2024-09-07 ASSESSMENT — PAIN SCALES - BEHAVIORAL PAIN SCALE (BPS): BPS_SCORE: 3

## 2024-09-08 VITALS
SYSTOLIC BLOOD PRESSURE: 74 MMHG | OXYGEN SATURATION: 72 % | HEART RATE: 130 BPM | HEIGHT: 65 IN | RESPIRATION RATE: 24 BRPM | WEIGHT: 136.47 LBS | DIASTOLIC BLOOD PRESSURE: 46 MMHG | BODY MASS INDEX: 22.74 KG/M2 | TEMPERATURE: 98.4 F

## 2024-09-08 PROCEDURE — 10002800 HB RX 250 W HCPCS

## 2024-09-08 PROCEDURE — 10004651 HB RX, NO CHARGE ITEM: Performed by: INTERNAL MEDICINE

## 2024-09-08 RX ADMIN — LEVETIRACETAM 500 MG: 100 INJECTION, SOLUTION INTRAVENOUS at 08:09

## 2024-09-08 RX ADMIN — SODIUM CHLORIDE, PRESERVATIVE FREE 2 ML: 5 INJECTION INTRAVENOUS at 08:09

## 2024-09-08 ASSESSMENT — PAIN SCALES - PAIN ASSESSMENT IN ADVANCED DEMENTIA (PAINAD)
BREATHING: NOISY LABORED BREATHING, LONG PERIODS HYPERVENTILATION, CHEYNE-STOKES RESPIRATIONS
TOTALSCORE: 3
BODYLANGUAGE: RELAXED
NEGVOCALIZATION: OCCASIONAL MOAN OR GROAN, LOW LEVELS OF SPEECH WITH A NEGATIVE OR DISAPPROVING QUALITY
CONSOLABILITY: NO NEED TO CONSOLE
FACIALEXPRESSION: SMILING OR INEXPRESSIVE